# Patient Record
Sex: FEMALE | Race: WHITE | NOT HISPANIC OR LATINO | URBAN - METROPOLITAN AREA
[De-identification: names, ages, dates, MRNs, and addresses within clinical notes are randomized per-mention and may not be internally consistent; named-entity substitution may affect disease eponyms.]

---

## 2024-09-03 ENCOUNTER — HOSPITAL ENCOUNTER (EMERGENCY)
Facility: HOSPITAL | Age: 39
Discharge: HOME | End: 2024-09-03
Attending: EMERGENCY MEDICINE
Payer: MEDICAID

## 2024-09-03 VITALS
TEMPERATURE: 98.5 F | OXYGEN SATURATION: 100 % | DIASTOLIC BLOOD PRESSURE: 55 MMHG | RESPIRATION RATE: 16 BRPM | SYSTOLIC BLOOD PRESSURE: 105 MMHG | HEART RATE: 97 BPM

## 2024-09-03 DIAGNOSIS — K04.7 DENTAL ABSCESS: Primary | ICD-10-CM

## 2024-09-03 PROCEDURE — 99284 EMERGENCY DEPT VISIT MOD MDM: CPT | Performed by: EMERGENCY MEDICINE

## 2024-09-03 PROCEDURE — 99282 EMERGENCY DEPT VISIT SF MDM: CPT

## 2024-09-03 RX ORDER — FLUCONAZOLE 150 MG/1
150 TABLET ORAL ONCE
Qty: 1 TABLET | Refills: 0 | Status: SHIPPED | OUTPATIENT
Start: 2024-09-03 | End: 2024-09-03

## 2024-09-03 RX ORDER — AMOXICILLIN 250 MG/1
500 CAPSULE ORAL ONCE
Status: COMPLETED | OUTPATIENT
Start: 2024-09-03 | End: 2024-09-03

## 2024-09-03 RX ORDER — AMOXICILLIN 500 MG/1
500 CAPSULE ORAL EVERY 8 HOURS SCHEDULED
Qty: 21 CAPSULE | Refills: 0 | Status: SHIPPED | OUTPATIENT
Start: 2024-09-03 | End: 2024-09-10

## 2024-09-03 RX ADMIN — AMOXICILLIN 500 MG: 250 CAPSULE ORAL at 10:46

## 2024-09-03 NOTE — ED PROVIDER NOTES
History  Chief Complaint   Patient presents with    Facial Swelling     Pt reports waking yesterday with swelling to left cheek. +Tenderness.        History provided by:  Patient and parent  Dental Pain  Location:  Lower  Quality:  Constant  Severity:  Moderate  Onset quality:  Gradual  Duration:  2 days  Timing:  Constant  Progression:  Worsening  Chronicity:  New  Context: poor dentition    Context: not recent dental surgery    Previous work-up:  Dental exam  Relieved by:  Nothing  Worsened by:  Nothing  Ineffective treatments:  None tried  Associated symptoms: facial swelling (woke up yesterday with some facial swelling and worsened today. Is on a waiting list for dental surgery in [place] for general anesthesia for removal of all teeth at once)    Associated symptoms: no facial pain, no fever, no gum swelling, no headaches and no trismus    Risk factors: no smoking        None       History reviewed. No pertinent past medical history.    History reviewed. No pertinent surgical history.    History reviewed. No pertinent family history.  I have reviewed and agree with the history as documented.    E-Cigarette/Vaping     E-Cigarette/Vaping Substances     Social History     Tobacco Use    Smoking status: Never    Smokeless tobacco: Never   Substance Use Topics    Alcohol use: Never    Drug use: Never       Review of Systems   Constitutional:  Negative for fever.   HENT:  Positive for facial swelling (woke up yesterday with some facial swelling and worsened today. Is on a waiting list for dental surgery in [place] for general anesthesia for removal of all teeth at once).    Neurological:  Negative for headaches.   All other systems reviewed and are negative.      Physical Exam  Physical Exam  Vitals reviewed.   HENT:      Head: Normocephalic and atraumatic.      Jaw: Swelling (some swelling left lower mandible without fluctuance) present. No pain on movement.        Mouth/Throat:      Dentition: Abnormal dentition (numerous  eroded teeth and caries with general overall poor dentition). Dental caries present. No gingival swelling or dental abscesses.   Eyes:      Extraocular Movements: Extraocular movements intact.      Pupils: Pupils are equal, round, and reactive to light.   Cardiovascular:      Rate and Rhythm: Normal rate.   Pulmonary:      Effort: Pulmonary effort is normal. No respiratory distress.      Breath sounds: No wheezing.   Musculoskeletal:      Cervical back: Neck supple.   Skin:     General: Skin is warm.      Findings: Rash (non specific skin excoriation to exposed areas with known picking) present.   Neurological:      General: No focal deficit present.      Mental Status: She is alert. Mental status is at baseline.         Vital Signs  ED Triage Vitals [09/03/24 1005]   Temperature Pulse Respirations Blood Pressure SpO2   98.5 °F (36.9 °C) 97 16 105/55 100 %      Temp Source Heart Rate Source Patient Position - Orthostatic VS BP Location FiO2 (%)   Oral Monitor Sitting Left arm --      Pain Score       --           Vitals:    09/03/24 1005   BP: 105/55   Pulse: 97   Patient Position - Orthostatic VS: Sitting         Visual Acuity      ED Medications  Medications   amoxicillin (AMOXIL) capsule 500 mg (has no administration in time range)       Diagnostic Studies  Results Reviewed       None                   No orders to display              Procedures  Procedures         ED Course                                 SBIRT 20yo+      Flowsheet Row Most Recent Value   Initial Alcohol Screen: US AUDIT-C     1. How often do you have a drink containing alcohol? 0 Filed at: 09/03/2024 1025   2. How many drinks containing alcohol do you have on a typical day you are drinking?  0 Filed at: 09/03/2024 1025   3a. Male UNDER 65: How often do you have five or more drinks on one occasion? 0 Filed at: 09/03/2024 1025   3b. FEMALE Any Age, or MALE 65+: How often do you have 4 or more drinks on one occassion? 0 Filed at: 09/03/2024 1025    Audit-C Score 0 Filed at: 09/03/2024 1025   MICHELLE: How many times in the past year have you...    Used an illegal drug or used a prescription medication for non-medical reasons? Never Filed at: 09/03/2024 1025                      Medical Decision Making  38-year-old female visiting from [place], is coming in for evaluation of left lower mandible swelling for the past 2 days.  Started yesterday but progressed today.  Came in because wanted to be evaluated before continue to worsen.  Patient has known significantly poor dentition for which she is supposed to have exam under anesthesia and all of her teeth removed at [facility] but is on a waiting list for that.  She has no ear pain fever chills or difficulty swallowing and no associated trismus.  Will treat patient with antibiotics and have her follow-up with her family doctors.  Patient is returning back to [place].  Also just requesting dose of Diflucan since patient gets yeast infections after antibiotics.                 Disposition  Final diagnoses:   Dental abscess     Time reflects when diagnosis was documented in both MDM as applicable and the Disposition within this note       Time User Action Codes Description Comment    9/3/2024 10:41 AM Whitney Richards Add [K04.7] Dental abscess           ED Disposition       ED Disposition   Discharge    Condition   Stable    Date/Time   Tue Sep 3, 2024 1041    Comment   Gaby Valdes discharge to home/self care.                   Follow-up Information       Follow up With Specialties Details Why Contact Info Additional Information    [facility] Emergency Medicine Go to  If symptoms worsen [address] [facility] [id number] [phone] [facility], [address] [place], [place], [address]            Patient's Medications   Discharge Prescriptions    AMOXICILLIN (AMOXIL) 500 MG CAPSULE    Take 1  capsule (500 mg total) by mouth every 8 (eight) hours for 7 days       Start Date: 9/3/2024  End Date: 9/10/2024       Order Dose: 500 mg       Quantity: 21 capsule    Refills: 0    FLUCONAZOLE (DIFLUCAN) 150 MG TABLET    Take 1 tablet (150 mg total) by mouth once for 1 dose       Start Date: 9/3/2024  End Date: 9/3/2024       Order Dose: 150 mg       Quantity: 1 tablet    Refills: 0       No discharge procedures on file.    PDMP Review       None            ED Provider  Electronically Signed by             Whitney Richards MD  09/03/24 1043

## 2024-10-18 ENCOUNTER — HOSPITAL ENCOUNTER (EMERGENCY)
Facility: HOSPITAL | Age: 39
Discharge: HOME | End: 2024-10-18
Attending: EMERGENCY MEDICINE
Payer: MEDICAID

## 2024-10-18 VITALS
TEMPERATURE: 98.7 F | HEART RATE: 98 BPM | SYSTOLIC BLOOD PRESSURE: 104 MMHG | RESPIRATION RATE: 20 BRPM | DIASTOLIC BLOOD PRESSURE: 68 MMHG | OXYGEN SATURATION: 99 %

## 2024-10-18 DIAGNOSIS — K04.7 DENTAL ABSCESS: Primary | ICD-10-CM

## 2024-10-18 PROCEDURE — 99282 EMERGENCY DEPT VISIT SF MDM: CPT

## 2024-10-18 PROCEDURE — 99284 EMERGENCY DEPT VISIT MOD MDM: CPT | Performed by: NURSE PRACTITIONER

## 2024-10-18 RX ORDER — IBUPROFEN 600 MG/1
600 TABLET, FILM COATED ORAL ONCE
Status: COMPLETED | OUTPATIENT
Start: 2024-10-18 | End: 2024-10-18

## 2024-10-18 RX ORDER — AMOXICILLIN 500 MG/1
500 CAPSULE ORAL 4 TIMES DAILY
Qty: 40 CAPSULE | Refills: 0 | Status: SHIPPED | OUTPATIENT
Start: 2024-10-18 | End: 2024-10-28

## 2024-10-18 RX ORDER — IBUPROFEN 600 MG/1
600 TABLET, FILM COATED ORAL EVERY 6 HOURS PRN
Qty: 30 TABLET | Refills: 0 | Status: SHIPPED | OUTPATIENT
Start: 2024-10-18 | End: 2024-10-23

## 2024-10-18 RX ORDER — AMOXICILLIN 250 MG/1
1000 CAPSULE ORAL ONCE
Status: COMPLETED | OUTPATIENT
Start: 2024-10-18 | End: 2024-10-18

## 2024-10-18 RX ADMIN — AMOXICILLIN 1000 MG: 250 CAPSULE ORAL at 15:08

## 2024-10-18 RX ADMIN — IBUPROFEN 600 MG: 600 TABLET, FILM COATED ORAL at 15:08

## 2024-10-18 NOTE — ED PROVIDER NOTES
Time reflects when diagnosis was documented in both MDM as applicable and the Disposition within this note       Time User Action Codes Description Comment    10/18/2024  2:50 PM Jeremy Roach Add [K04.7] Dental abscess           ED Disposition       ED Disposition   Discharge    Condition   Stable    Date/Time   Fri Oct 18, 2024  2:50 PM    Comment   Gaby Valdes discharge to home/self care.                   Assessment & Plan       Medical Decision Making  Dental abscess without any systemic involvement or without any evidence of Jerciho's angina.    Risk  Prescription drug management.             Medications   amoxicillin (AMOXIL) capsule 1,000 mg (1,000 mg Oral Given 10/18/24 1508)   ibuprofen (MOTRIN) tablet 600 mg (600 mg Oral Given 10/18/24 1508)       ED Risk Strat Scores                                               History of Present Illness       Chief Complaint   Patient presents with    Facial Swelling     C/o left lower facial swelling that she woke up with.        History reviewed. No pertinent past medical history.   History reviewed. No pertinent surgical history.   History reviewed. No pertinent family history.   Social History     Tobacco Use    Smoking status: Never    Smokeless tobacco: Never   Substance Use Topics    Alcohol use: Never    Drug use: Never      E-Cigarette/Vaping      E-Cigarette/Vaping Substances      I have reviewed and agree with the history as documented.     38-year-old female presenting the emergency department with a chief complaint of left facial swelling.  She woke up this way.  She has a history of generally poor dentition with multiple missing teeth and dental decay noted.  Symptoms are most consistent with dental abscess.  No evidence of Jericho's angina at this point no elevation of the tongue or fullness subglottal          Review of Systems   Constitutional:  Negative for diaphoresis, fatigue and fever.   HENT:  Positive for dental problem and facial swelling.  Negative for congestion, ear pain, nosebleeds and sore throat.    Eyes:  Negative for photophobia, pain, discharge and visual disturbance.   Respiratory:  Negative for cough, choking, chest tightness, shortness of breath and wheezing.    Cardiovascular:  Negative for chest pain and palpitations.   Gastrointestinal:  Negative for abdominal distention, abdominal pain, diarrhea and vomiting.   Genitourinary:  Negative for dysuria, flank pain and frequency.   Musculoskeletal:  Negative for back pain, gait problem and joint swelling.   Skin:  Negative for color change and rash.   Neurological:  Negative for dizziness, syncope and headaches.   Psychiatric/Behavioral:  Negative for behavioral problems and confusion. The patient is not nervous/anxious.    All other systems reviewed and are negative.          Objective       ED Triage Vitals   Temperature Pulse Blood Pressure Respirations SpO2 Patient Position - Orthostatic VS   10/18/24 1437 10/18/24 1437 10/18/24 1437 10/18/24 1437 10/18/24 1437 10/18/24 1437   98.7 °F (37.1 °C) 98 104/68 20 99 % Sitting      Temp src Heart Rate Source BP Location FiO2 (%) Pain Score    -- 10/18/24 1437 10/18/24 1437 -- 10/18/24 1508     Monitor Left arm  3      Vitals      Date and Time Temp Pulse SpO2 Resp BP Pain Score FACES Pain Rating User   10/18/24 1508 -- -- -- -- -- 3 -- EN   10/18/24 1437 98.7 °F (37.1 °C) 98 99 % 20 104/68 -- -- AS            Physical Exam  Vitals and nursing note reviewed.   Constitutional:       General: She is not in acute distress.     Appearance: She is well-developed. She is not ill-appearing or toxic-appearing.   HENT:      Head: Normocephalic and atraumatic.      Nose: No rhinorrhea.      Mouth/Throat:      Mouth: Mucous membranes are moist.      Dentition: Abnormal dentition. Dental caries and dental abscesses present.   Eyes:      General:         Right eye: No discharge.         Left eye: No discharge.   Cardiovascular:      Rate and Rhythm: Normal  rate and regular rhythm.   Pulmonary:      Effort: Pulmonary effort is normal. No accessory muscle usage or respiratory distress.   Abdominal:      General: There is no distension.      Tenderness: There is no guarding.   Musculoskeletal:         General: Normal range of motion.      Cervical back: Normal range of motion and neck supple. No rigidity.   Skin:     General: Skin is warm and dry.   Neurological:      Mental Status: She is alert and oriented to person, place, and time.      Coordination: Coordination normal.   Psychiatric:         Behavior: Behavior is cooperative.         Results Reviewed       None            No orders to display       Procedures    ED Medication and Procedure Management   None     Discharge Medication List as of 10/18/2024  2:51 PM        START taking these medications    Details   amoxicillin (AMOXIL) 500 mg capsule Take 1 capsule (500 mg total) by mouth 4 (four) times a day for 10 days, Starting Fri 10/18/2024, Until Mon 10/28/2024, Normal      ibuprofen (MOTRIN) 600 mg tablet Take 1 tablet (600 mg total) by mouth every 6 (six) hours as needed for mild pain for up to 5 days, Starting Fri 10/18/2024, Until Wed 10/23/2024 at 2359, Normal           No discharge procedures on file.  ED SEPSIS DOCUMENTATION   Time reflects when diagnosis was documented in both MDM as applicable and the Disposition within this note       Time User Action Codes Description Comment    10/18/2024  2:50 PM Jeremy Roach Add [K04.7] Dental abscess                  MARTHA Braga  10/18/24 0025

## 2024-11-14 ENCOUNTER — HOSPITAL ENCOUNTER (EMERGENCY)
Facility: HOSPITAL | Age: 39
Discharge: HOME | End: 2024-11-14
Attending: EMERGENCY MEDICINE
Payer: MEDICAID

## 2024-11-14 VITALS
HEART RATE: 90 BPM | SYSTOLIC BLOOD PRESSURE: 100 MMHG | BODY MASS INDEX: 28.79 KG/M2 | WEIGHT: 190 LBS | HEIGHT: 68 IN | TEMPERATURE: 97.8 F | DIASTOLIC BLOOD PRESSURE: 55 MMHG | OXYGEN SATURATION: 100 % | RESPIRATION RATE: 20 BRPM

## 2024-11-14 DIAGNOSIS — R22.0 FACIAL SWELLING: ICD-10-CM

## 2024-11-14 DIAGNOSIS — K04.7 DENTAL ABSCESS: ICD-10-CM

## 2024-11-14 DIAGNOSIS — K04.7 DENTAL INFECTION: Primary | ICD-10-CM

## 2024-11-14 PROCEDURE — 99282 EMERGENCY DEPT VISIT SF MDM: CPT

## 2024-11-14 PROCEDURE — 99284 EMERGENCY DEPT VISIT MOD MDM: CPT

## 2024-11-14 RX ORDER — SENNOSIDES 8.6 MG
650 CAPSULE ORAL EVERY 8 HOURS PRN
Qty: 60 TABLET | Refills: 0 | Status: SHIPPED | OUTPATIENT
Start: 2024-11-14

## 2024-11-14 RX ORDER — CLINDAMYCIN HYDROCHLORIDE 150 MG/1
450 CAPSULE ORAL ONCE
Status: COMPLETED | OUTPATIENT
Start: 2024-11-14 | End: 2024-11-14

## 2024-11-14 RX ORDER — IBUPROFEN 600 MG/1
600 TABLET, FILM COATED ORAL EVERY 6 HOURS PRN
Qty: 60 TABLET | Refills: 0 | Status: SHIPPED | OUTPATIENT
Start: 2024-11-14 | End: 2024-11-29

## 2024-11-14 RX ORDER — CLINDAMYCIN HYDROCHLORIDE 150 MG/1
450 CAPSULE ORAL EVERY 8 HOURS SCHEDULED
Qty: 126 CAPSULE | Refills: 0 | Status: SHIPPED | OUTPATIENT
Start: 2024-11-14 | End: 2024-11-28

## 2024-11-14 RX ADMIN — CLINDAMYCIN HYDROCHLORIDE 450 MG: 150 CAPSULE ORAL at 14:40

## 2024-11-14 NOTE — ED PROVIDER NOTES
Time reflects when diagnosis was documented in both MDM as applicable and the Disposition within this note       Time User Action Codes Description Comment    11/14/2024  2:25 PM Sunil Sanchez Add [K04.7] Dental infection     11/14/2024  2:25 PM Sunil Sanchez Add [R22.0] Facial swelling     11/14/2024  2:32 PM Sunil Sanchez Add [K04.7] Dental abscess           ED Disposition       ED Disposition   Discharge    Condition   Stable    Date/Time   Thu Nov 14, 2024  2:25 PM    Comment   Gaby Valdes discharge to home/self care.                   Assessment & Plan       Medical Decision Making  Patient is a 37 y/o F who presents with her mother complaining of facial swelling onset yesterday. Patient has had recurrent facial swelling for over 2 months. Patient has been seen here twice before and given amoxicillin which resolves the swelling. Patient has [place] [employer] insurance and is on a wait list with an oral surgeon to have here teeth removed. Patient denies any fever, chills, swelling below her jaw, trismus, trouble breathing, trouble swallowing, or other symptoms at this time.     Afebrile. VSS. Patient is resting comfortably on bed. In no acute distress. Maintaining airway without difficulty. Pleasant and cooperative. Poor dentition. No dental tenderness. Gingiva is erythematous around tooth insertion but not swollen. Small amount of left side swelling along maxilla.     Recommended salt water gargles, brushing teeth twice a day with soft toothbrush, eating soft foods, and cool or warm compresses. Prescribed clindamycin 450 mg TID for 14 days, ibuprofen, and tylenol. Advised to f/u with Oral surgery or dentist ASAP. Case discussed with Dr. Willingham. Advised patient to return with any new or worsening symptoms including but not limited to swelling below jaw, trouble breathing, trouble swallowing, lockjaw, fever, or chills. Discussed assessment and plan with patient who verbalizes understanding and agrees with  plan.    Risk  OTC drugs.  Prescription drug management.        ED Course as of 11/14/24 1454   Thu Nov 14, 2024   1453 Blood Pressure: 100/55   1453 Temperature: 97.8 °F (36.6 °C)   1453 Pulse: 90   1453 Respirations: 20   1453 SpO2: 100 %       Medications   clindamycin (CLEOCIN) capsule 450 mg (450 mg Oral Given 11/14/24 1440)       ED Risk Strat Scores                                               History of Present Illness       Chief Complaint   Patient presents with    Facial Swelling     Left sided cheek and facial swelling that began a few days ago. Is currently on a wait list for dental surgery.        History reviewed. No pertinent past medical history.   History reviewed. No pertinent surgical history.   History reviewed. No pertinent family history.   Social History     Tobacco Use    Smoking status: Never    Smokeless tobacco: Never   Substance Use Topics    Alcohol use: Never    Drug use: Never      E-Cigarette/Vaping      E-Cigarette/Vaping Substances      I have reviewed and agree with the history as documented.     Patient is a 37 y/o F who presents with her mother complaining of facial swelling onset yesterday. Patient has had recurrent facial swelling for over 2 months. Patient has been seen here twice before and given amoxicillin which resolves the swelling. Patient has [place] [employer] insurance and is on a wait list with an oral surgeon to have here teeth removed. Patient denies any fever, chills, swelling below her jaw, trismus, trouble breathing, trouble swallowing, or other symptoms at this time.       History provided by:  Patient   used: No        Review of Systems   Constitutional: Negative.  Negative for chills and fever.   HENT:  Positive for dental problem and facial swelling. Negative for ear pain, sore throat, trouble swallowing and voice change.    Eyes: Negative.    Respiratory: Negative.  Negative for cough and shortness of breath.    Cardiovascular: Negative.   Negative for chest pain.   Gastrointestinal: Negative.    Genitourinary: Negative.    Musculoskeletal: Negative.    Skin: Negative.  Negative for color change and rash.   Neurological: Negative.    All other systems reviewed and are negative.          Objective       ED Triage Vitals [11/14/24 1317]   Temperature Pulse Blood Pressure Respirations SpO2 Patient Position - Orthostatic VS   97.8 °F (36.6 °C) 90 100/55 20 100 % Sitting      Temp Source Heart Rate Source BP Location FiO2 (%) Pain Score    Tympanic Monitor Left arm -- --      Vitals      Date and Time Temp Pulse SpO2 Resp BP Pain Score FACES Pain Rating User   11/14/24 1317 97.8 °F (36.6 °C) 90 100 % 20 100/55 -- -- LA            Physical Exam  Vitals and nursing note reviewed.   Constitutional:       General: She is awake. She is not in acute distress.     Appearance: Normal appearance. She is well-developed. She is not ill-appearing, toxic-appearing or diaphoretic.      Comments: Patient is resting comfortably on bed. In no acute distress. Maintaining airway without difficulty. Pleasant and cooperative.    HENT:      Head: Normocephalic and atraumatic.      Right Ear: External ear normal.      Left Ear: External ear normal.      Nose: Nose normal.      Mouth/Throat:      Lips: Pink.      Mouth: Mucous membranes are moist.      Dentition: Abnormal dentition. No dental tenderness.      Tongue: No lesions. Tongue does not deviate from midline.      Palate: No mass and lesions.      Pharynx: Oropharynx is clear. Uvula midline.      Tonsils: No tonsillar exudate or tonsillar abscesses.      Comments: Poor dentition. No dental tenderness. Gingiva is erythematous around tooth insertion but not swollen. Small amount of left side swelling along maxilla.   Eyes:      General: Lids are normal.      Extraocular Movements: Extraocular movements intact.      Conjunctiva/sclera: Conjunctivae normal.   Cardiovascular:      Rate and Rhythm: Normal rate and regular  rhythm.      Heart sounds: Normal heart sounds. No murmur heard.  Pulmonary:      Effort: Pulmonary effort is normal. No respiratory distress.      Breath sounds: Normal breath sounds. No stridor. No wheezing, rhonchi or rales.   Musculoskeletal:         General: Normal range of motion.      Cervical back: Normal range of motion.   Skin:     General: Skin is warm and dry.   Neurological:      Mental Status: She is alert and oriented to person, place, and time.   Psychiatric:         Attention and Perception: Attention normal.         Mood and Affect: Mood normal.         Speech: Speech normal.         Behavior: Behavior normal. Behavior is cooperative.         Results Reviewed       None            No orders to display       Procedures    ED Medication and Procedure Management   Prior to Admission Medications   Prescriptions Last Dose Informant Patient Reported? Taking?   ibuprofen (MOTRIN) 600 mg tablet   No No   Sig: Take 1 tablet (600 mg total) by mouth every 6 (six) hours as needed for mild pain for up to 5 days   ibuprofen (MOTRIN) 600 mg tablet   No Yes   Sig: Take 1 tablet (600 mg total) by mouth every 6 (six) hours as needed for mild pain for up to 15 days      Facility-Administered Medications: None     Discharge Medication List as of 11/14/2024  2:31 PM        START taking these medications    Details   clindamycin (CLEOCIN) 150 mg capsule Take 3 capsules (450 mg total) by mouth every 8 (eight) hours for 14 days, Starting Thu 11/14/2024, Until Thu 11/28/2024, Normal           CONTINUE these medications which have NOT CHANGED    Details   ibuprofen (MOTRIN) 600 mg tablet Take 1 tablet (600 mg total) by mouth every 6 (six) hours as needed for mild pain for up to 5 days, Starting Fri 10/18/2024, Until Wed 10/23/2024 at 2359, Normal           No discharge procedures on file.  ED SEPSIS DOCUMENTATION   Time reflects when diagnosis was documented in both MDM as applicable and the Disposition within this note        Time User Action Codes Description Comment    11/14/2024  2:25 PM Sunil Sanchez Add [K04.7] Dental infection     11/14/2024  2:25 PM Sunil Sanchez [R22.0] Facial swelling     11/14/2024  2:32 PM Sunil Sanchez [K04.7] Dental abscess                  Sunil Sanchez PA-C  11/14/24 4127

## 2024-11-14 NOTE — ED ATTENDING ATTESTATION
11/14/2024  I, Luisa Willingham DO, saw and evaluated the patient. I have discussed the patient with the resident/non-physician practitioner and agree with the resident's/non-physician practitioner's findings, Plan of Care, and MDM as documented in the resident's/non-physician practitioner's note, except where noted. All available labs and Radiology studies were reviewed.  I was present for key portions of any procedure(s) performed by the resident/non-physician practitioner and I was immediately available to provide assistance.       At this point I agree with the current assessment done in the Emergency Department.  I have conducted an independent evaluation of this patient a history and physical is as follows:  Dental infection - repeat infection. Left lower mandibular tenderness and swelling, without fluctuance, no palpable abscess.  No signs of systemic illness. Failed amoxil twice, will change to clinda and will advise f/u dental clinic.      ED Course         Critical Care Time  Procedures

## 2024-11-14 NOTE — DISCHARGE INSTRUCTIONS
Rinse your mouth every 2 hours with salt water. This will help keep the area clean. Gently brush your teeth twice a day with a soft tooth brush. This will help keep the area clean. Eat soft foods as directed. Soft foods may cause less pain. Examples include applesauce, yogurt, and cooked pasta. Ask your healthcare provider how long to follow this instruction. Apply a warm compress to your tooth or gum. Use a cotton ball or gauze soaked in warm water. Remove the compress in 10 minutes or when it becomes cool. Repeat 3 times a day. Follow up with your doctor or dentist in 24 hours. Use tylenol and ibuprofen for pain management as needed. Take antibiotic three times a day for 7-14 days.

## 2025-02-14 ENCOUNTER — HOSPITAL ENCOUNTER (EMERGENCY)
Facility: HOSPITAL | Age: 40
Discharge: HOME | End: 2025-02-14
Attending: EMERGENCY MEDICINE
Payer: MEDICAID

## 2025-02-14 VITALS
HEART RATE: 85 BPM | OXYGEN SATURATION: 100 % | RESPIRATION RATE: 18 BRPM | DIASTOLIC BLOOD PRESSURE: 50 MMHG | TEMPERATURE: 98.6 F | SYSTOLIC BLOOD PRESSURE: 93 MMHG

## 2025-02-14 DIAGNOSIS — R22.0 FACIAL SWELLING: ICD-10-CM

## 2025-02-14 DIAGNOSIS — K04.7 DENTAL INFECTION: Primary | ICD-10-CM

## 2025-02-14 DIAGNOSIS — K08.89 PAIN, DENTAL: ICD-10-CM

## 2025-02-14 PROCEDURE — 99282 EMERGENCY DEPT VISIT SF MDM: CPT

## 2025-02-14 PROCEDURE — 99284 EMERGENCY DEPT VISIT MOD MDM: CPT

## 2025-02-14 RX ORDER — CLINDAMYCIN HYDROCHLORIDE 150 MG/1
450 CAPSULE ORAL EVERY 8 HOURS SCHEDULED
Qty: 90 CAPSULE | Refills: 0 | Status: SHIPPED | OUTPATIENT
Start: 2025-02-14 | End: 2025-02-24

## 2025-02-14 RX ORDER — CLINDAMYCIN HYDROCHLORIDE 150 MG/1
450 CAPSULE ORAL ONCE
Status: COMPLETED | OUTPATIENT
Start: 2025-02-14 | End: 2025-02-14

## 2025-02-14 RX ORDER — IBUPROFEN 600 MG/1
600 TABLET, FILM COATED ORAL EVERY 6 HOURS PRN
Qty: 30 TABLET | Refills: 0 | Status: SHIPPED | OUTPATIENT
Start: 2025-02-14

## 2025-02-14 RX ADMIN — CLINDAMYCIN HYDROCHLORIDE 450 MG: 150 CAPSULE ORAL at 11:23

## 2025-02-14 RX ADMIN — CLINDAMYCIN HYDROCHLORIDE 450 MG: 150 CAPSULE ORAL at 11:24

## 2025-02-14 NOTE — ED PROVIDER NOTES
Time reflects when diagnosis was documented in both MDM as applicable and the Disposition within this note       Time User Action Codes Description Comment    2/14/2025 11:19 AM Sunil Sanchez Add [K04.7] Dental infection     2/14/2025 11:19 AM Sunil Sanchez Add [R22.0] Facial swelling     2/14/2025 11:19 AM Sunil Sanchez Add [K08.89] Pain, dental           ED Disposition       ED Disposition   Discharge    Condition   Stable    Date/Time   Fri Feb 14, 2025 11:19 AM    Comment   Gaby Valdes discharge to home/self care.                   Assessment & Plan       Medical Decision Making  Vital signs stable.  Afebrile.  Patient is well-appearing. Patient is resting comfortably on bed, in no acute chest.  Pleasant and cooperative.  Mother at bedside.  No evidence of [place]ich[place]'s angina, stridor, drooling, tripoding, trismus, or respiratory distress.  Speaking complete sentences. Swelling and tenderness of her left lower jaw.  No midline swelling below jaw. Patient has very poor dentition.  Redness and swelling to gums consistent with localized gingivitis.  No abscess formation.    Ordered 450 mg of clindamycin to be given in the ED and patient given an additional dose to go as her mother states that the pharmacy will not have the medication ready until tomorrow.  Offered ibuprofen but patient took medication prior to arrival.    Recommended soft food, avoidance of food and drink at extreme temperatures, icing area of swelling, brushing teeth twice a day with soft toothbrush, salt water gargle or gargles with mouthwash, and tylenol/ibuprofen. Prescribed 450 mg of clindamycin 3 times daily for 10 days and 600 mg of ibuprofen. Advised to f/u with dentist. Case discussed with Dr. Hernandez. Advised patient to return with any new or worsening symptoms including but not limited to fever, trouble swallowing, lockjaw, drooling, difficulty breathing, swelling centrally under jaw, drooling, or other concerning symptoms. Discussed  assessment and plan with patient who verbalizes understanding and agrees with plan.    Risk  Prescription drug management.        ED Course as of 02/14/25 1139   Fri Feb 14, 2025   1046 Blood Pressure: 93/50   1047 Temperature: 98.6 °F (37 °C)   1047 Pulse: 85   1047 Respirations: 18   1047 SpO2: 100 %       Medications   clindamycin (CLEOCIN) capsule 450 mg (450 mg Oral Given 2/14/25 1124)   clindamycin (CLEOCIN) capsule 450 mg (450 mg Oral Given 2/14/25 1123)       ED Risk Strat Scores                                              History of Present Illness       Chief Complaint   Patient presents with    Facial Swelling     Known dental abscesses's to left side of face requiring surgery, patient reportedly on waiting list for their needed procedure. Reports waking up this AM and reports left sided swelling to cheek and jaw. Hx of similar episodes with previous dental surgeries.        History reviewed. No pertinent past medical history.   History reviewed. No pertinent surgical history.   History reviewed. No pertinent family history.   Social History     Tobacco Use    Smoking status: Never    Smokeless tobacco: Never   Vaping Use    Vaping status: Never Used   Substance Use Topics    Alcohol use: Never    Drug use: Never      E-Cigarette/Vaping    E-Cigarette Use Never User       E-Cigarette/Vaping Substances      I have reviewed and agree with the history as documented.     Patient is a 39-year-old female with a history of Lennox-Gastaut and poor dentition who presents complaining of left-sided facial swelling onset this morning.  Patient has been seen in the ED several times for this before.  Patient was seen in November and started on clindamycin which mother reports worked very well.  Patient took ibuprofen this morning.  Patient and mother deny fever, chills, drooling, lockjaw, trouble breathing, rashes, swelling below the jaw, or any other symptoms at this time.      History provided by:  Patient  Language   used: No        Review of Systems   Constitutional: Negative.  Negative for chills and fever.   HENT:  Positive for dental problem, ear pain and facial swelling. Negative for drooling, sore throat, trouble swallowing and voice change.    Eyes: Negative.    Respiratory: Negative.  Negative for shortness of breath, wheezing and stridor.    Cardiovascular: Negative.  Negative for chest pain.   Gastrointestinal: Negative.  Negative for vomiting.   Musculoskeletal: Negative.  Negative for neck pain and neck stiffness.   Skin: Negative.  Negative for color change and rash.   Neurological: Negative.  Negative for seizures, syncope, weakness, numbness and headaches.   All other systems reviewed and are negative.          Objective       ED Triage Vitals [02/14/25 1008]   Temperature Pulse Blood Pressure Respirations SpO2 Patient Position - Orthostatic VS   98.6 °F (37 °C) 85 93/50 18 100 % Sitting      Temp Source Heart Rate Source BP Location FiO2 (%) Pain Score    Oral Monitor Left arm -- --      Vitals      Date and Time Temp Pulse SpO2 Resp BP Pain Score FACES Pain Rating User   02/14/25 1008 98.6 °F (37 °C) 85 100 % 18 93/50 -- -- RJ            Physical Exam  Vitals and nursing note reviewed.   Constitutional:       General: She is awake. She is not in acute distress.     Appearance: Normal appearance. She is well-developed and well-groomed. She is not ill-appearing, toxic-appearing or diaphoretic.      Comments: Patient is resting comfortably on bed, in no acute chest.  Pleasant and cooperative.  Mother at bedside.  No evidence of [place]icho's angina, stridor, drooling, tripoding, trismus, or respiratory distress.  Speaking complete sentences.   HENT:      Head: Normocephalic and atraumatic. No raccoon eyes, abrasion, contusion, right periorbital erythema, left periorbital erythema or laceration.      Jaw: Tenderness and swelling present. No trismus.      Comments: Swelling and tenderness of her left lower  jaw.  No midline swelling below jaw.     Right Ear: Tympanic membrane, ear canal and external ear normal.      Left Ear: Tympanic membrane, ear canal and external ear normal.      Nose: Nose normal.      Mouth/Throat:      Lips: Pink.      Mouth: Mucous membranes are moist. No oral lesions.      Dentition: Abnormal dentition. Does not have dentures. Dental tenderness, gingival swelling and dental caries present. No dental abscesses.      Tongue: No lesions. Tongue does not deviate from midline.      Palate: No mass and lesions.      Pharynx: Oropharynx is clear. Uvula midline. No pharyngeal swelling, oropharyngeal exudate, posterior oropharyngeal erythema, uvula swelling or postnasal drip.      Tonsils: No tonsillar exudate or tonsillar abscesses.      Comments: Patient has very poor dentition.  Redness and swelling to gums consistent with localized gingivitis.  No abscess formation.  Eyes:      General: Lids are normal.      Extraocular Movements: Extraocular movements intact.      Conjunctiva/sclera: Conjunctivae normal.   Cardiovascular:      Rate and Rhythm: Normal rate.      Heart sounds: Normal heart sounds. No murmur heard.  Pulmonary:      Effort: Pulmonary effort is normal. No tachypnea or respiratory distress.      Breath sounds: Normal breath sounds and air entry.   Musculoskeletal:         General: No swelling. Normal range of motion.      Cervical back: Normal range of motion.   Skin:     General: Skin is warm and dry.   Neurological:      Mental Status: She is alert and oriented to person, place, and time.      GCS: GCS eye subscore is 4. GCS verbal subscore is 5. GCS motor subscore is 6.   Psychiatric:         Attention and Perception: Attention normal.         Mood and Affect: Mood normal.         Speech: Speech normal.         Behavior: Behavior normal. Behavior is cooperative.         Results Reviewed       None            No orders to display       Procedures    ED Medication and Procedure  Management   Prior to Admission Medications   Prescriptions Last Dose Informant Patient Reported? Taking?   acetaminophen (TYLENOL) 650 mg CR tablet   No No   Sig: Take 1 tablet (650 mg total) by mouth every 8 (eight) hours as needed for mild pain   ibuprofen (MOTRIN) 600 mg tablet   No No   Sig: Take 1 tablet (600 mg total) by mouth every 6 (six) hours as needed for mild pain for up to 15 days      Facility-Administered Medications: None     Discharge Medication List as of 2/14/2025 11:20 AM        START taking these medications    Details   clindamycin (CLEOCIN) 150 mg capsule Take 3 capsules (450 mg total) by mouth every 8 (eight) hours for 10 days, Starting Fri 2/14/2025, Until Mon 2/24/2025, Normal           CONTINUE these medications which have CHANGED    Details   ibuprofen (MOTRIN) 600 mg tablet Take 1 tablet (600 mg total) by mouth every 6 (six) hours as needed for mild pain, Starting Fri 2/14/2025, Normal           CONTINUE these medications which have NOT CHANGED    Details   acetaminophen (TYLENOL) 650 mg CR tablet Take 1 tablet (650 mg total) by mouth every 8 (eight) hours as needed for mild pain, Starting Thu 11/14/2024, Normal           No discharge procedures on file.  ED SEPSIS DOCUMENTATION   Time reflects when diagnosis was documented in both MDM as applicable and the Disposition within this note       Time User Action Codes Description Comment    2/14/2025 11:19 AM Sunil Sanchez [K04.7] Dental infection     2/14/2025 11:19 AM Sunil Sanchez Add [R22.0] Facial swelling     2/14/2025 11:19 AM Sunil Sanchez [K08.89] Pain, dental                  Sunil Sanchez PA-C  02/14/25 7989

## 2025-02-14 NOTE — DISCHARGE INSTRUCTIONS
Recommend soft food, avoidance of food and drink at extreme temperatures, icing area of swelling, brushing teeth twice a day with soft toothbrush, salt water gargle or gargles with mouthwash, and tylenol/ibuprofen. Take antibiotic as prescribed. Follow up with a dentist as soon as possible. Return for any new or worsening symptoms including but not limited to fever, trouble swallowing, lockjaw, drooling, difficulty breathing, swelling centrally under jaw, drooling, or other concerning symptoms.